# Patient Record
Sex: FEMALE | Race: BLACK OR AFRICAN AMERICAN | Employment: OTHER | URBAN - METROPOLITAN AREA
[De-identification: names, ages, dates, MRNs, and addresses within clinical notes are randomized per-mention and may not be internally consistent; named-entity substitution may affect disease eponyms.]

---

## 2019-08-13 ENCOUNTER — OFFICE VISIT (OUTPATIENT)
Dept: SURGERY | Facility: CLINIC | Age: 49
End: 2019-08-13
Payer: MEDICARE

## 2019-08-13 VITALS — HEIGHT: 64 IN | WEIGHT: 208 LBS | BODY MASS INDEX: 35.51 KG/M2

## 2019-08-13 DIAGNOSIS — R10.32 ABDOMINAL DISCOMFORT IN LEFT LOWER QUADRANT: Primary | ICD-10-CM

## 2019-08-13 DIAGNOSIS — R10.9 ABDOMINAL PAIN DETERMINED BY EXAMINATION: ICD-10-CM

## 2019-08-13 PROCEDURE — 99214 OFFICE O/P EST MOD 30 MIN: CPT | Performed by: SURGERY

## 2019-08-13 NOTE — PATIENT INSTRUCTIONS
Evaluation with physical therapy for persistent abdominal pain. X    GI evaluation for fecal incontinence, and irritable bowel syndrome.

## 2019-08-13 NOTE — H&P
History and Physical      HPI   Patient presents with:  Post-Op: post op hernia repair in May 2019.   Patient complaines of abdomnal pain umbilical area and L groin area,      HPI  Joey Domínguez is a 46year old female who presents with persistent epigastri Constitutional: appears well hydrated alert and responsive no acute distress noted  Head/Face: normocephalic  Nose/Mouth/Throat: nose and throat are clear palate is intact mucous membranes are moist no oral lesions are noted  Neck/Thyroid: neck is supple

## 2019-08-13 NOTE — H&P
History and Physical      HPI   Patient presents with:  Post-Op: post op hernia repair in May 2019.   Patient complaines of abdomnal pain umbilical area and L groin area,      HPI  Nguyễn Chambers is a 46year old female who presents with persistent epigastri Constitutional: appears well hydrated alert and responsive no acute distress noted  Head/Face: normocephalic  Nose/Mouth/Throat: nose and throat are clear palate is intact mucous membranes are moist no oral lesions are noted  Neck/Thyroid: neck is supple

## 2019-08-16 ENCOUNTER — TELEPHONE (OUTPATIENT)
Dept: SURGERY | Facility: CLINIC | Age: 49
End: 2019-08-16

## 2019-08-16 NOTE — TELEPHONE ENCOUNTER
Pt c/o abdominal pain. She states that she was up all night, and tried taking tylenol 3, and regular tylenol, and it isn't working.  I recommended that she try Ibuprofen 600 mg with food, but she stated she is unable to take ibuprofen due to her other medic

## 2021-11-17 ENCOUNTER — OFFICE VISIT (OUTPATIENT)
Dept: SURGERY | Facility: CLINIC | Age: 51
End: 2021-11-17
Payer: MEDICARE

## 2021-11-17 ENCOUNTER — TELEPHONE (OUTPATIENT)
Dept: SURGERY | Facility: CLINIC | Age: 51
End: 2021-11-17

## 2021-11-17 VITALS — HEIGHT: 63 IN | BODY MASS INDEX: 38.98 KG/M2 | WEIGHT: 220 LBS

## 2021-11-17 DIAGNOSIS — R10.9 ABDOMINAL PAIN, UNSPECIFIED ABDOMINAL LOCATION: Primary | ICD-10-CM

## 2021-11-17 PROCEDURE — 99213 OFFICE O/P EST LOW 20 MIN: CPT | Performed by: SURGERY

## 2021-11-17 NOTE — PROGRESS NOTES
Established Patient Follow-up      11/17/2021    Kevin Valentine 46year old      HPI  Patient presents with:  Referral: Referral Nicholas Huitron for Possible Umbilical Hernia. Patient reports pain in the mid abdomen that started about one month ago.       Nataly
Normal vision: sees adequately in most situations; can see medication labels, newsprint

## 2021-11-17 NOTE — TELEPHONE ENCOUNTER
Per pt will be having CT tomorrow at [address] [facility] facility please fax today at [phone] or [phone]. Thank you.

## 2021-11-17 NOTE — PATIENT INSTRUCTIONS
Obtain CT scan abdomen pelvis either at [place] or [facility].  Call to discuss results and further plan

## 2021-11-19 ENCOUNTER — TELEPHONE (OUTPATIENT)
Dept: SURGERY | Facility: CLINIC | Age: 51
End: 2021-11-19

## 2021-11-19 NOTE — TELEPHONE ENCOUNTER
Dr. Fox Talib to patient 12:30, Patient had CT at [facility] yesterday? Patient still having a lot of abdominal pain. Patient would like to know if she should make another appt with you.       Patient

## 2021-11-22 NOTE — TELEPHONE ENCOUNTER
PC to patient informed her CT was normal.  (Per MD DANICA). Patient contact made and patient expressed understanding.     KELVIN